# Patient Record
Sex: MALE | Race: WHITE | NOT HISPANIC OR LATINO | Employment: OTHER | URBAN - NONMETROPOLITAN AREA
[De-identification: names, ages, dates, MRNs, and addresses within clinical notes are randomized per-mention and may not be internally consistent; named-entity substitution may affect disease eponyms.]

---

## 2023-03-11 PROBLEM — M19.90 OSTEOARTHROSIS: Status: ACTIVE | Noted: 2023-03-11

## 2023-03-11 PROBLEM — M25.561 RIGHT KNEE PAIN: Status: ACTIVE | Noted: 2023-03-11

## 2023-03-11 PROBLEM — Z86.2 HISTORY OF SARCOIDOSIS: Status: ACTIVE | Noted: 2023-03-11

## 2023-03-11 PROBLEM — D12.6 ADENOMATOUS COLON POLYP: Status: ACTIVE | Noted: 2023-03-11

## 2023-03-11 PROBLEM — M25.50 ARTHRALGIA OF MULTIPLE JOINTS: Status: ACTIVE | Noted: 2023-03-11

## 2023-03-11 PROBLEM — E78.5 HYPERLIPIDEMIA: Status: ACTIVE | Noted: 2023-03-11

## 2023-03-11 PROBLEM — K21.9 GERD (GASTROESOPHAGEAL REFLUX DISEASE): Status: ACTIVE | Noted: 2023-03-11

## 2023-03-11 RX ORDER — MULTIVITAMIN
TABLET ORAL
COMMUNITY

## 2023-03-11 RX ORDER — GUAIFENESIN/PHENYLPROPANOLAMIN
EXPECTORANT ORAL
COMMUNITY

## 2023-03-11 RX ORDER — EPINEPHRINE 0.22MG
AEROSOL WITH ADAPTER (ML) INHALATION
COMMUNITY

## 2023-03-21 ENCOUNTER — OFFICE VISIT (OUTPATIENT)
Dept: PRIMARY CARE | Facility: CLINIC | Age: 88
End: 2023-03-21
Payer: MEDICARE

## 2023-03-21 ENCOUNTER — LAB (OUTPATIENT)
Dept: LAB | Facility: LAB | Age: 88
End: 2023-03-21
Payer: MEDICARE

## 2023-03-21 VITALS
WEIGHT: 190.4 LBS | RESPIRATION RATE: 14 BRPM | OXYGEN SATURATION: 97 % | BODY MASS INDEX: 28.85 KG/M2 | DIASTOLIC BLOOD PRESSURE: 76 MMHG | HEART RATE: 83 BPM | SYSTOLIC BLOOD PRESSURE: 116 MMHG | TEMPERATURE: 97.8 F | HEIGHT: 68 IN

## 2023-03-21 DIAGNOSIS — Z96.651 TOTAL KNEE REPLACEMENT STATUS, RIGHT: ICD-10-CM

## 2023-03-21 DIAGNOSIS — D64.9 ANEMIA, UNSPECIFIED TYPE: ICD-10-CM

## 2023-03-21 DIAGNOSIS — N39.0 URINARY TRACT INFECTION WITHOUT HEMATURIA, SITE UNSPECIFIED: Primary | ICD-10-CM

## 2023-03-21 DIAGNOSIS — R55 SYNCOPE, UNSPECIFIED SYNCOPE TYPE: ICD-10-CM

## 2023-03-21 PROBLEM — M25.561 RIGHT KNEE PAIN: Status: RESOLVED | Noted: 2023-03-11 | Resolved: 2023-03-21

## 2023-03-21 PROCEDURE — 99214 OFFICE O/P EST MOD 30 MIN: CPT | Performed by: FAMILY MEDICINE

## 2023-03-21 PROCEDURE — 85025 COMPLETE CBC W/AUTO DIFF WBC: CPT

## 2023-03-21 PROCEDURE — 1160F RVW MEDS BY RX/DR IN RCRD: CPT | Performed by: FAMILY MEDICINE

## 2023-03-21 PROCEDURE — 1159F MED LIST DOCD IN RCRD: CPT | Performed by: FAMILY MEDICINE

## 2023-03-21 PROCEDURE — 1036F TOBACCO NON-USER: CPT | Performed by: FAMILY MEDICINE

## 2023-03-21 PROCEDURE — 36415 COLL VENOUS BLD VENIPUNCTURE: CPT

## 2023-03-21 RX ORDER — ASPIRIN 325 MG
325 TABLET ORAL ONCE
COMMUNITY
End: 2023-12-14 | Stop reason: WASHOUT

## 2023-03-21 RX ORDER — TALC
250 POWDER (GRAM) TOPICAL DAILY
COMMUNITY

## 2023-03-21 NOTE — PROGRESS NOTES
"Subjective     Patient ID: Juan Ashby is a 88 y.o. male who presents for a hospital follow up.     Flu shot- 09/26/2022  COVID shot- Moderna   Tdap- 07/23/2021  Colon Screen- 04/15/2021    Had knee replacement from [facility] right knee about a month ago and has been doing good.     The day after he came home from his knee surgery, he passed out.   Had a UTI     HPI  Patient had a syncopal episode while at home on 2/22. He states he was subsequently found to be hypotensive. He had R knee replacement 2 days prior to episode. He visited the ED the same day. Imaging was negative for brain damage. His condition improved with fluids. UA revealed bacteria in urine. He was sent home with antibiotics. Symptoms have resolved since discharge.    Patient states that R knee incision is healing well. He did notice a scab below the incision but it improved on its own. He is recovering well from surgery. He is ambulating without a cane. He follows with PT.     Review of Systems  constitutional: as per HPI  eyes:as per HPI  CV:as per HPI  Respiratory:as per HPI  GI:as per HPI  neuro:as per HPI  endo:as per HPI  heme/lymph:as per HPI     Objective   /76 (BP Location: Right arm, Patient Position: Sitting, BP Cuff Size: Adult)   Pulse 83   Temp 36.6 °C (97.8 °F) (Temporal)   Resp 14   Ht 1.727 m (5' 8\")   Wt 86.4 kg (190 lb 6.4 oz)   SpO2 97%   BMI 28.95 kg/m²   Physical Exam  Constitutional:       Appearance: Normal appearance. He is overweight.   Cardiovascular:      Rate and Rhythm: Normal rate and regular rhythm.   Pulmonary:      Effort: Pulmonary effort is normal.      Breath sounds: Normal breath sounds.   Skin:     Comments: Healed abrasion 3 cm below incision on R knee         Assessment/Plan   Problem List Items Addressed This Visit    None  Visit Diagnoses       Urinary tract infection without hematuria, site unspecified    -  Primary    Identified during ED visit on 2/22/23.  Resolved after antibiotic " course.    Syncope, unspecified syncope type        Seen in ED on 2/22/23.  Suspected due to low blood pressure, dehydration, fluid loss s/p surgery.  No recurrent episodes.    Total knee replacement status, right        Incision healing well.  Good ambulation and ROM of knee.  Continue with PT exercises.    Anemia, unspecified type        Discussed that post-operative anemia is not uncommon.  Ordered CBC to recheck anemia status.    Relevant Orders    CBC and Auto Differential                  TSH has continued to increased from 4.44 last year to 6.19 3 weeks ago.  No symptoms of hypothyroidism.  Will recheck TSH after next appt.    Follow up in June.     By signing my name below I, tammi Love, attest that this documentation has been prepared under the direction and in the presence of Dr. John Sandoval. 03/21/23

## 2023-03-22 LAB
BASOPHILS (10*3/UL) IN BLOOD BY AUTOMATED COUNT: 0.04 X10E9/L (ref 0–0.1)
BASOPHILS/100 LEUKOCYTES IN BLOOD BY AUTOMATED COUNT: 0.5 % (ref 0–2)
EOSINOPHILS (10*3/UL) IN BLOOD BY AUTOMATED COUNT: 0.65 X10E9/L (ref 0–0.4)
EOSINOPHILS/100 LEUKOCYTES IN BLOOD BY AUTOMATED COUNT: 8.1 % (ref 0–6)
ERYTHROCYTE DISTRIBUTION WIDTH (RATIO) BY AUTOMATED COUNT: 13.5 % (ref 11.5–14.5)
ERYTHROCYTE MEAN CORPUSCULAR HEMOGLOBIN CONCENTRATION (G/DL) BY AUTOMATED: 32 G/DL (ref 32–36)
ERYTHROCYTE MEAN CORPUSCULAR VOLUME (FL) BY AUTOMATED COUNT: 101 FL (ref 80–100)
ERYTHROCYTES (10*6/UL) IN BLOOD BY AUTOMATED COUNT: 4.07 X10E12/L (ref 4.5–5.9)
HEMATOCRIT (%) IN BLOOD BY AUTOMATED COUNT: 41.3 % (ref 41–52)
HEMOGLOBIN (G/DL) IN BLOOD: 13.2 G/DL (ref 13.5–17.5)
IMMATURE GRANULOCYTES/100 LEUKOCYTES IN BLOOD BY AUTOMATED COUNT: 0.2 % (ref 0–0.9)
LEUKOCYTES (10*3/UL) IN BLOOD BY AUTOMATED COUNT: 8.1 X10E9/L (ref 4.4–11.3)
LYMPHOCYTES (10*3/UL) IN BLOOD BY AUTOMATED COUNT: 1.97 X10E9/L (ref 0.8–3)
LYMPHOCYTES/100 LEUKOCYTES IN BLOOD BY AUTOMATED COUNT: 24.4 % (ref 13–44)
MONOCYTES (10*3/UL) IN BLOOD BY AUTOMATED COUNT: 0.77 X10E9/L (ref 0.05–0.8)
MONOCYTES/100 LEUKOCYTES IN BLOOD BY AUTOMATED COUNT: 9.6 % (ref 2–10)
NEUTROPHILS (10*3/UL) IN BLOOD BY AUTOMATED COUNT: 4.61 X10E9/L (ref 1.6–5.5)
NEUTROPHILS/100 LEUKOCYTES IN BLOOD BY AUTOMATED COUNT: 57.2 % (ref 40–80)
NRBC (PER 100 WBCS) BY AUTOMATED COUNT: 0 /100 WBC (ref 0–0)
PLATELETS (10*3/UL) IN BLOOD AUTOMATED COUNT: 325 X10E9/L (ref 150–450)

## 2023-06-14 ENCOUNTER — OFFICE VISIT (OUTPATIENT)
Dept: PRIMARY CARE | Facility: CLINIC | Age: 88
End: 2023-06-14
Payer: MEDICARE

## 2023-06-14 ENCOUNTER — LAB (OUTPATIENT)
Dept: LAB | Facility: LAB | Age: 88
End: 2023-06-14
Payer: MEDICARE

## 2023-06-14 VITALS
RESPIRATION RATE: 14 BRPM | SYSTOLIC BLOOD PRESSURE: 112 MMHG | HEART RATE: 73 BPM | WEIGHT: 181.1 LBS | BODY MASS INDEX: 27.54 KG/M2 | DIASTOLIC BLOOD PRESSURE: 72 MMHG | TEMPERATURE: 97.8 F | OXYGEN SATURATION: 94 %

## 2023-06-14 DIAGNOSIS — E03.8 OTHER SPECIFIED HYPOTHYROIDISM: ICD-10-CM

## 2023-06-14 DIAGNOSIS — R53.83 FATIGUE, UNSPECIFIED TYPE: ICD-10-CM

## 2023-06-14 DIAGNOSIS — K21.9 GASTROESOPHAGEAL REFLUX DISEASE WITHOUT ESOPHAGITIS: ICD-10-CM

## 2023-06-14 DIAGNOSIS — E78.2 MIXED HYPERLIPIDEMIA: ICD-10-CM

## 2023-06-14 DIAGNOSIS — Z13.89 ENCOUNTER FOR SCREENING FOR OTHER DISORDER: ICD-10-CM

## 2023-06-14 DIAGNOSIS — E03.9 HYPOTHYROIDISM, UNSPECIFIED TYPE: ICD-10-CM

## 2023-06-14 DIAGNOSIS — K21.9 GASTROESOPHAGEAL REFLUX DISEASE WITHOUT ESOPHAGITIS: Primary | ICD-10-CM

## 2023-06-14 DIAGNOSIS — R73.03 PREDIABETES: ICD-10-CM

## 2023-06-14 DIAGNOSIS — D64.9 ANEMIA, UNSPECIFIED TYPE: ICD-10-CM

## 2023-06-14 PROBLEM — Z96.651 STATUS POST TOTAL RIGHT KNEE REPLACEMENT: Status: ACTIVE | Noted: 2023-03-13

## 2023-06-14 PROBLEM — I10 ESSENTIAL (PRIMARY) HYPERTENSION: Status: RESOLVED | Noted: 2023-02-09 | Resolved: 2023-06-14

## 2023-06-14 PROBLEM — I10 ESSENTIAL (PRIMARY) HYPERTENSION: Status: ACTIVE | Noted: 2023-02-09

## 2023-06-14 PROCEDURE — 85025 COMPLETE CBC W/AUTO DIFF WBC: CPT

## 2023-06-14 PROCEDURE — 84439 ASSAY OF FREE THYROXINE: CPT

## 2023-06-14 PROCEDURE — 1170F FXNL STATUS ASSESSED: CPT | Performed by: FAMILY MEDICINE

## 2023-06-14 PROCEDURE — 99214 OFFICE O/P EST MOD 30 MIN: CPT | Performed by: FAMILY MEDICINE

## 2023-06-14 PROCEDURE — 1159F MED LIST DOCD IN RCRD: CPT | Performed by: FAMILY MEDICINE

## 2023-06-14 PROCEDURE — 83540 ASSAY OF IRON: CPT

## 2023-06-14 PROCEDURE — 1036F TOBACCO NON-USER: CPT | Performed by: FAMILY MEDICINE

## 2023-06-14 PROCEDURE — 1160F RVW MEDS BY RX/DR IN RCRD: CPT | Performed by: FAMILY MEDICINE

## 2023-06-14 PROCEDURE — G0444 DEPRESSION SCREEN ANNUAL: HCPCS | Performed by: FAMILY MEDICINE

## 2023-06-14 PROCEDURE — 84443 ASSAY THYROID STIM HORMONE: CPT

## 2023-06-14 PROCEDURE — 84630 ASSAY OF ZINC: CPT

## 2023-06-14 PROCEDURE — 80061 LIPID PANEL: CPT

## 2023-06-14 PROCEDURE — 36415 COLL VENOUS BLD VENIPUNCTURE: CPT

## 2023-06-14 PROCEDURE — G0439 PPPS, SUBSEQ VISIT: HCPCS | Performed by: FAMILY MEDICINE

## 2023-06-14 PROCEDURE — 83550 IRON BINDING TEST: CPT

## 2023-06-14 PROCEDURE — 82607 VITAMIN B-12: CPT

## 2023-06-14 PROCEDURE — 83036 HEMOGLOBIN GLYCOSYLATED A1C: CPT

## 2023-06-14 ASSESSMENT — PATIENT HEALTH QUESTIONNAIRE - PHQ9
2. FEELING DOWN, DEPRESSED OR HOPELESS: NOT AT ALL
1. LITTLE INTEREST OR PLEASURE IN DOING THINGS: NOT AT ALL
SUM OF ALL RESPONSES TO PHQ9 QUESTIONS 1 AND 2: 0

## 2023-06-14 ASSESSMENT — ACTIVITIES OF DAILY LIVING (ADL)
BATHING: INDEPENDENT
DOING_HOUSEWORK: INDEPENDENT
TAKING_MEDICATION: INDEPENDENT
DRESSING: INDEPENDENT
GROCERY_SHOPPING: INDEPENDENT
MANAGING_FINANCES: INDEPENDENT

## 2023-06-14 NOTE — PROGRESS NOTES
Subjective   Reason for Visit: Juan Ashby is an 88 y.o. male here for a Medicare Wellness visit.      Tdap-  due  Colonoscopy- 04/15/2021     Reviewed all medications by prescribing practitioner or clinical pharmacist (such as prescriptions, OTCs, herbal therapies and supplements) and documented in the medical record.    HPI  Patient had right knee arthroplasty with Dr. Cordero on 2/20. The next day he experienced syncopal episode and was seen at the ED. He was diagnosed with UTI and prescribed Z-juanita. Since then, he has not had any recurrent syncope. Patient also had skin infection to the knee s/p surgery. He was treated with oral antibiotics until May. He states that knee pain has been minimal since surgery. He has completed PT.     Patient reports fatigue.    Patient Care Team:  John Sandoval MD as PCP - General  John Sandoval MD as PCP - [facility] Attributed Provider     Review of Systems  constitutional: as per HPI  eyes:as per HPI  CV:as per HPI  Respiratory:as per HPI  GI:as per HPI  neuro:as per HPI  endo:as per HPI  heme/lymph:as per HPI     Objective   Vitals:  /72 (BP Location: Left arm, Patient Position: Sitting, BP Cuff Size: Adult)   Pulse 73   Temp 36.6 °C (97.8 °F) (Temporal)   Resp 14   Wt 82.1 kg (181 lb 1.6 oz)   SpO2 94%   BMI 27.54 kg/m²       Physical Exam  Constitutional:       Appearance: Normal appearance. He is overweight.   Cardiovascular:      Rate and Rhythm: Normal rate and regular rhythm.   Pulmonary:      Effort: Pulmonary effort is normal.      Breath sounds: Normal breath sounds.         Assessment/Plan   Problem List Items Addressed This Visit          Digestive    Gastroesophageal reflux disease without esophagitis - Primary    Current Assessment & Plan     Stable.  May take TUMS or Rolaids as needed.  No eating within 2 hours of going to bed.  May want to elevate the head of your bed.  Avoid caffeine, chocolate, alcohol, spicy foods, acidic foods, fried foods, mint  "flavoring.            Endocrine/Metabolic    Prediabetes    Current Assessment & Plan     Most recent A1c:   Hemoglobin A1C (%)   Date Value   05/26/2021 5.5     You have pre-diabetic level blood sugar.  This means you are at risk for developing diabetes.  ;  I recommend you avoid processed carbohydrates and sugar containing foods/beverages.  If you must have a sweetened beverage, please use Stevia and avoid artificial sweeteners such as aspartame, sucralose, sweet n low, etc.;DRINK WATER!    A good diet plan to follow is the Mediterranean diet.  Use online search to read more about this.;    Another simple rule is to shop the grocery store perimeter -thus filling your cart with fresh fruits, vegetables, lean meats (fish, chicken); dairy, cheese, and nuts.  Avoid the isles where you find bagged and boxed items that are processed. (chips, crackers, cookies, candies, snacks, etc.);    Aerobic cardiovascular exercise 150min weekly is essential for good health, BP control, sugar metabolism, and weight management.;June 14, 2023          Other specified hypothyroidism    Current Assessment & Plan     Most recent blood work showed mildly low thyroid function, which could explain symptom of fatigue.  Ordered repeat thyroid function labs.            Other    Mixed hyperlipidemia    Current Assessment & Plan     Continue lifestyle measures to control cholesterol.    Natural efforts to lower LDL cholesterol:  1. Diet should be predominantly plant based (veggies, fruits, healthy fats such as olive oil, nuts, seeds, avocados, proteins from non meat such as quinoa (\"keenwha\"), chickpeas, lentil, soy, tofu - if choose animal source of protein, choose fish first, skinless poultry second, and 'red' meat as least frequent option  2. eat blueberries 1 cup daily  3. use supplement called plant stanol 950mg twice daily with meals (one brand name is \"Cholest-off\"  4. use psyllium husk fiber such as metamucil powder 1 tablespoon in 8 " ounces water three times a day before meals - gradually work to this dosing over 3 weeks - starting once daily and every week add another dose              By signing my name below I, tammi Love, attest that this documentation has been prepared under the direction and in the presence of Dr. John Sandoval. 06/14/23

## 2023-06-14 NOTE — ASSESSMENT & PLAN NOTE
Most recent A1c:   Hemoglobin A1C (%)   Date Value   05/26/2021 5.5       You have pre-diabetic level blood sugar.  This means you are at risk for developing diabetes.  ;  I recommend you avoid processed carbohydrates and sugar containing foods/beverages.  If you must have a sweetened beverage, please use Stevia and avoid artificial sweeteners such as aspartame, sucralose, sweet n low, etc.;DRINK WATER!    A good diet plan to follow is the Mediterranean diet.  Use online search to read more about this.;    Another simple rule is to shop the grocery store perimeter -thus filling your cart with fresh fruits, vegetables, lean meats (fish, chicken); dairy, cheese, and nuts.  Avoid the isles where you find bagged and boxed items that are processed. (chips, crackers, cookies, candies, snacks, etc.);    Aerobic cardiovascular exercise 150min weekly is essential for good health, BP control, sugar metabolism, and weight management.;June 14, 2023

## 2023-06-14 NOTE — ASSESSMENT & PLAN NOTE
"Continue lifestyle measures to control cholesterol.    Natural efforts to lower LDL cholesterol:  1. Diet should be predominantly plant based (veggies, fruits, healthy fats such as olive oil, nuts, seeds, avocados, proteins from non meat such as quinoa (\"keenwha\"), chickpeas, lentil, soy, tofu - if choose animal source of protein, choose fish first, skinless poultry second, and 'red' meat as least frequent option  2. eat blueberries 1 cup daily  3. use supplement called plant stanol 950mg twice daily with meals (one brand name is \"Cholest-off\"  4. use psyllium husk fiber such as metamucil powder 1 tablespoon in 8 ounces water three times a day before meals - gradually work to this dosing over 3 weeks - starting once daily and every week add another dose   "

## 2023-06-14 NOTE — ASSESSMENT & PLAN NOTE
Most recent blood work showed mildly low thyroid function, which could explain symptom of fatigue.  Ordered repeat thyroid function labs.

## 2023-06-14 NOTE — ASSESSMENT & PLAN NOTE
Stable.  May take TUMS or Rolaids as needed.  No eating within 2 hours of going to bed.  May want to elevate the head of your bed.  Avoid caffeine, chocolate, alcohol, spicy foods, acidic foods, fried foods, mint flavoring.

## 2023-06-15 LAB
BASOPHILS (10*3/UL) IN BLOOD BY AUTOMATED COUNT: 0.04 X10E9/L (ref 0–0.1)
BASOPHILS/100 LEUKOCYTES IN BLOOD BY AUTOMATED COUNT: 0.5 % (ref 0–2)
CHOLESTEROL (MG/DL) IN SER/PLAS: 239 MG/DL (ref 0–199)
CHOLESTEROL IN HDL (MG/DL) IN SER/PLAS: 59 MG/DL
CHOLESTEROL/HDL RATIO: 4.1
COBALAMIN (VITAMIN B12) (PG/ML) IN SER/PLAS: 492 PG/ML (ref 211–911)
EOSINOPHILS (10*3/UL) IN BLOOD BY AUTOMATED COUNT: 0.27 X10E9/L (ref 0–0.4)
EOSINOPHILS/100 LEUKOCYTES IN BLOOD BY AUTOMATED COUNT: 3.6 % (ref 0–6)
ERYTHROCYTE DISTRIBUTION WIDTH (RATIO) BY AUTOMATED COUNT: 13.4 % (ref 11.5–14.5)
ERYTHROCYTE MEAN CORPUSCULAR HEMOGLOBIN CONCENTRATION (G/DL) BY AUTOMATED: 32.2 G/DL (ref 32–36)
ERYTHROCYTE MEAN CORPUSCULAR VOLUME (FL) BY AUTOMATED COUNT: 97 FL (ref 80–100)
ERYTHROCYTES (10*6/UL) IN BLOOD BY AUTOMATED COUNT: 5.03 X10E12/L (ref 4.5–5.9)
ESTIMATED AVERAGE GLUCOSE FOR HBA1C: 131 MG/DL
HEMATOCRIT (%) IN BLOOD BY AUTOMATED COUNT: 48.7 % (ref 41–52)
HEMOGLOBIN (G/DL) IN BLOOD: 15.7 G/DL (ref 13.5–17.5)
HEMOGLOBIN A1C/HEMOGLOBIN TOTAL IN BLOOD: 6.2 %
IMMATURE GRANULOCYTES/100 LEUKOCYTES IN BLOOD BY AUTOMATED COUNT: 0.4 % (ref 0–0.9)
IRON (UG/DL) IN SER/PLAS: 77 UG/DL (ref 35–150)
IRON BINDING CAPACITY (UG/DL) IN SER/PLAS: 352 UG/DL (ref 240–445)
IRON SATURATION (%) IN SER/PLAS: 22 % (ref 25–45)
LDL: 161 MG/DL (ref 0–99)
LEUKOCYTES (10*3/UL) IN BLOOD BY AUTOMATED COUNT: 7.4 X10E9/L (ref 4.4–11.3)
LYMPHOCYTES (10*3/UL) IN BLOOD BY AUTOMATED COUNT: 1.99 X10E9/L (ref 0.8–3)
LYMPHOCYTES/100 LEUKOCYTES IN BLOOD BY AUTOMATED COUNT: 26.8 % (ref 13–44)
MONOCYTES (10*3/UL) IN BLOOD BY AUTOMATED COUNT: 0.53 X10E9/L (ref 0.05–0.8)
MONOCYTES/100 LEUKOCYTES IN BLOOD BY AUTOMATED COUNT: 7.1 % (ref 2–10)
NEUTROPHILS (10*3/UL) IN BLOOD BY AUTOMATED COUNT: 4.56 X10E9/L (ref 1.6–5.5)
NEUTROPHILS/100 LEUKOCYTES IN BLOOD BY AUTOMATED COUNT: 61.6 % (ref 40–80)
NRBC (PER 100 WBCS) BY AUTOMATED COUNT: 0 /100 WBC (ref 0–0)
PLATELETS (10*3/UL) IN BLOOD AUTOMATED COUNT: 225 X10E9/L (ref 150–450)
THYROTROPIN (MIU/L) IN SER/PLAS BY DETECTION LIMIT <= 0.05 MIU/L: 4.09 MIU/L (ref 0.44–3.98)
THYROXINE (T4) FREE (NG/DL) IN SER/PLAS: 1.03 NG/DL (ref 0.78–1.48)
TRIGLYCERIDE (MG/DL) IN SER/PLAS: 95 MG/DL (ref 0–149)
VLDL: 19 MG/DL (ref 0–40)

## 2023-06-17 LAB — ZINC,SERUM OR PLASMA: 89.9 UG/DL (ref 60–120)

## 2023-06-22 ENCOUNTER — TELEPHONE (OUTPATIENT)
Dept: PRIMARY CARE | Facility: CLINIC | Age: 88
End: 2023-06-22

## 2023-06-22 NOTE — TELEPHONE ENCOUNTER
The patients spouse is calling to find out the results on recent labs. Please call her on the home phone

## 2023-12-14 ENCOUNTER — OFFICE VISIT (OUTPATIENT)
Dept: PRIMARY CARE | Facility: CLINIC | Age: 88
End: 2023-12-14
Payer: MEDICARE

## 2023-12-14 ENCOUNTER — LAB (OUTPATIENT)
Dept: LAB | Facility: LAB | Age: 88
End: 2023-12-14
Payer: MEDICARE

## 2023-12-14 VITALS
RESPIRATION RATE: 14 BRPM | TEMPERATURE: 97.5 F | WEIGHT: 180.8 LBS | OXYGEN SATURATION: 96 % | HEART RATE: 64 BPM | BODY MASS INDEX: 27.49 KG/M2 | SYSTOLIC BLOOD PRESSURE: 125 MMHG | DIASTOLIC BLOOD PRESSURE: 72 MMHG

## 2023-12-14 DIAGNOSIS — E03.9 HYPOTHYROIDISM, UNSPECIFIED TYPE: ICD-10-CM

## 2023-12-14 DIAGNOSIS — R73.03 PREDIABETES: ICD-10-CM

## 2023-12-14 DIAGNOSIS — E78.2 MIXED HYPERLIPIDEMIA: ICD-10-CM

## 2023-12-14 DIAGNOSIS — K21.9 GASTROESOPHAGEAL REFLUX DISEASE WITHOUT ESOPHAGITIS: ICD-10-CM

## 2023-12-14 DIAGNOSIS — E03.8 OTHER SPECIFIED HYPOTHYROIDISM: ICD-10-CM

## 2023-12-14 DIAGNOSIS — M25.50 ARTHRALGIA OF MULTIPLE JOINTS: ICD-10-CM

## 2023-12-14 DIAGNOSIS — E78.2 MIXED HYPERLIPIDEMIA: Primary | ICD-10-CM

## 2023-12-14 PROBLEM — C44.611 BASAL CELL CARCINOMA (BCC) OF UPPER EXTREMITY: Status: ACTIVE | Noted: 2023-12-14

## 2023-12-14 PROCEDURE — 90662 IIV NO PRSV INCREASED AG IM: CPT | Performed by: FAMILY MEDICINE

## 2023-12-14 PROCEDURE — 1036F TOBACCO NON-USER: CPT | Performed by: FAMILY MEDICINE

## 2023-12-14 PROCEDURE — 80053 COMPREHEN METABOLIC PANEL: CPT

## 2023-12-14 PROCEDURE — 1159F MED LIST DOCD IN RCRD: CPT | Performed by: FAMILY MEDICINE

## 2023-12-14 PROCEDURE — G0008 ADMIN INFLUENZA VIRUS VAC: HCPCS | Performed by: FAMILY MEDICINE

## 2023-12-14 PROCEDURE — 85025 COMPLETE CBC W/AUTO DIFF WBC: CPT

## 2023-12-14 PROCEDURE — 1160F RVW MEDS BY RX/DR IN RCRD: CPT | Performed by: FAMILY MEDICINE

## 2023-12-14 PROCEDURE — 36415 COLL VENOUS BLD VENIPUNCTURE: CPT

## 2023-12-14 PROCEDURE — 83036 HEMOGLOBIN GLYCOSYLATED A1C: CPT

## 2023-12-14 PROCEDURE — 99214 OFFICE O/P EST MOD 30 MIN: CPT | Performed by: FAMILY MEDICINE

## 2023-12-14 PROCEDURE — 84443 ASSAY THYROID STIM HORMONE: CPT

## 2023-12-14 PROCEDURE — 80061 LIPID PANEL: CPT

## 2023-12-14 PROCEDURE — 84439 ASSAY OF FREE THYROXINE: CPT

## 2023-12-14 NOTE — ASSESSMENT & PLAN NOTE
Lab Results   Component Value Date    HGBA1C 6.2 (A) 06/14/2023     Repeat A1C ordered.    You have pre-diabetic level blood sugar.  This means you are at risk for developing diabetes.  ;  I recommend you avoid processed carbohydrates and sugar containing foods/beverages.  If you must have a sweetened beverage, please use Stevia and avoid artificial sweeteners such as aspartame, sucralose, sweet n low, etc.;DRINK WATER!    A good diet plan to follow is the Mediterranean diet.  Use online search to read more about this.;    Another simple rule is to shop the grocery store perimeter -thus filling your cart with fresh fruits, vegetables, lean meats (fish, chicken); dairy, cheese, and nuts.  Avoid the isles where you find bagged and boxed items that are processed. (chips, crackers, cookies, candies, snacks, etc.);    Aerobic cardiovascular exercise 150min weekly is essential for good health, BP control, sugar metabolism, and weight management.;

## 2023-12-14 NOTE — ASSESSMENT & PLAN NOTE
"Lab Results   Component Value Date    CHOL 239 (H) 06/14/2023    CHOL 234 (H) 12/07/2022    CHOL 216 (H) 05/26/2021     Lab Results   Component Value Date    HDL 59.0 06/14/2023    HDL 66.1 12/07/2022    HDL 61.2 05/26/2021       Lab Results   Component Value Date    TRIG 95 06/14/2023    TRIG 109 12/07/2022    TRIG 75 05/26/2021       Continue lifestyle measures to control cholesterol.  Repeat lipid panel ordered.    Natural efforts to lower LDL cholesterol:  1. Diet should be predominantly plant based (veggies, fruits, healthy fats such as olive oil, nuts, seeds, avocados, proteins from non meat such as quinoa (\"keenwha\"), chickpeas, lentil, soy, tofu - if choose animal source of protein, choose fish first, skinless poultry second, and 'red' meat as least frequent option  2. eat blueberries 1 cup daily  3. use supplement called plant stanol 950mg twice daily with meals (one brand name is \"Cholest-off\"  4. use psyllium husk fiber such as metamucil powder 1 tablespoon in 8 ounces water three times a day before meals - gradually work to this dosing over 3 weeks - starting once daily and every week add another dose   "

## 2023-12-14 NOTE — PROGRESS NOTES
"Subjective   Patient ID: Juan Ashby is an 88 year old male here for a follow-up for chronic health conditions.    He is no longer taking baby aspirin. He feels well overall. He does not take Tylenol for joint pain.   He now has hearing aids.         Review of Systems   HENT:  Positive for hearing loss.        Objective   /72 (BP Location: Left arm, Patient Position: Sitting, BP Cuff Size: Adult)   Pulse 64   Temp 36.4 °C (97.5 °F)   Resp 14   Wt 82 kg (180 lb 12.8 oz)   SpO2 96%   BMI 27.49 kg/m²     Physical Exam  Constitutional:       Appearance: Normal appearance.   Cardiovascular:      Rate and Rhythm: Normal rate and regular rhythm.      Pulses: Normal pulses.      Heart sounds: Normal heart sounds.   Pulmonary:      Effort: Pulmonary effort is normal.      Breath sounds: Normal breath sounds.   Neurological:      Mental Status: He is alert.           Problem List Items Addressed This Visit          Cardiac and Vasculature    Mixed hyperlipidemia - Primary     Lab Results   Component Value Date    CHOL 239 (H) 06/14/2023    CHOL 234 (H) 12/07/2022    CHOL 216 (H) 05/26/2021     Lab Results   Component Value Date    HDL 59.0 06/14/2023    HDL 66.1 12/07/2022    HDL 61.2 05/26/2021     Lab Results   Component Value Date    TRIG 95 06/14/2023    TRIG 109 12/07/2022    TRIG 75 05/26/2021     Continue lifestyle measures to control cholesterol.  Repeat lipid panel ordered.    Natural efforts to lower LDL cholesterol:  1. Diet should be predominantly plant based (veggies, fruits, healthy fats such as olive oil, nuts, seeds, avocados, proteins from non meat such as quinoa (\"keenwha\"), chickpeas, lentil, soy, tofu - if choose animal source of protein, choose fish first, skinless poultry second, and 'red' meat as least frequent option  2. eat blueberries 1 cup daily  3. use supplement called plant stanol 950mg twice daily with meals (one brand name is \"Cholest-off\"  4. use psyllium husk fiber such as " metamucil powder 1 tablespoon in 8 ounces water three times a day before meals - gradually work to this dosing over 3 weeks - starting once daily and every week add another dose             Endocrine/Metabolic    Prediabetes     Lab Results   Component Value Date    HGBA1C 6.2 (A) 06/14/2023   Repeat A1C ordered.    You have pre-diabetic level blood sugar.  This means you are at risk for developing diabetes.  ;  I recommend you avoid processed carbohydrates and sugar containing foods/beverages.  If you must have a sweetened beverage, please use Stevia and avoid artificial sweeteners such as aspartame, sucralose, sweet n low, etc.;DRINK WATER!    A good diet plan to follow is the Mediterranean diet.  Use online search to read more about this.;    Another simple rule is to shop the grocery store perimeter -thus filling your cart with fresh fruits, vegetables, lean meats (fish, chicken); dairy, cheese, and nuts.  Avoid the isles where you find bagged and boxed items that are processed. (chips, crackers, cookies, candies, snacks, etc.);    Aerobic cardiovascular exercise 150min weekly is essential for good health, BP control, sugar metabolism, and weight management.;         Other specified hypothyroidism     Lab Results   Component Value Date    TSH 4.09 (H) 06/14/2023   Repeat TSH level ordered.            Musculoskeletal and Injuries    Arthralgia of multiple joints     Scribe Attestation  By signing my name below, I, Keyonna Holt , Scribe   attest that this documentation has been prepared under the direction and in the presence of John Sandoval MD.

## 2023-12-15 LAB
ALBUMIN SERPL BCP-MCNC: 4.6 G/DL (ref 3.4–5)
ALP SERPL-CCNC: 75 U/L (ref 33–136)
ALT SERPL W P-5'-P-CCNC: 17 U/L (ref 10–52)
ANION GAP SERPL CALC-SCNC: 13 MMOL/L (ref 10–20)
AST SERPL W P-5'-P-CCNC: 18 U/L (ref 9–39)
BASOPHILS # BLD AUTO: 0.04 X10*3/UL (ref 0–0.1)
BASOPHILS NFR BLD AUTO: 0.6 %
BILIRUB SERPL-MCNC: 1.6 MG/DL (ref 0–1.2)
BUN SERPL-MCNC: 15 MG/DL (ref 6–23)
CALCIUM SERPL-MCNC: 10 MG/DL (ref 8.6–10.6)
CHLORIDE SERPL-SCNC: 104 MMOL/L (ref 98–107)
CHOLEST SERPL-MCNC: 237 MG/DL (ref 0–199)
CHOLESTEROL/HDL RATIO: 3.9
CO2 SERPL-SCNC: 30 MMOL/L (ref 21–32)
CREAT SERPL-MCNC: 0.59 MG/DL (ref 0.5–1.3)
EOSINOPHIL # BLD AUTO: 0.32 X10*3/UL (ref 0–0.4)
EOSINOPHIL NFR BLD AUTO: 5.2 %
ERYTHROCYTE [DISTWIDTH] IN BLOOD BY AUTOMATED COUNT: 13 % (ref 11.5–14.5)
EST. AVERAGE GLUCOSE BLD GHB EST-MCNC: 111 MG/DL
GFR SERPL CREATININE-BSD FRML MDRD: >90 ML/MIN/1.73M*2
GLUCOSE SERPL-MCNC: 105 MG/DL (ref 74–99)
HBA1C MFR BLD: 5.5 %
HCT VFR BLD AUTO: 46.3 % (ref 41–52)
HDLC SERPL-MCNC: 60.1 MG/DL
HGB BLD-MCNC: 15.4 G/DL (ref 13.5–17.5)
IMM GRANULOCYTES # BLD AUTO: 0.01 X10*3/UL (ref 0–0.5)
IMM GRANULOCYTES NFR BLD AUTO: 0.2 % (ref 0–0.9)
LDLC SERPL CALC-MCNC: 156 MG/DL
LYMPHOCYTES # BLD AUTO: 1.88 X10*3/UL (ref 0.8–3)
LYMPHOCYTES NFR BLD AUTO: 30.4 %
MCH RBC QN AUTO: 32.6 PG (ref 26–34)
MCHC RBC AUTO-ENTMCNC: 33.3 G/DL (ref 32–36)
MCV RBC AUTO: 98 FL (ref 80–100)
MONOCYTES # BLD AUTO: 0.54 X10*3/UL (ref 0.05–0.8)
MONOCYTES NFR BLD AUTO: 8.7 %
NEUTROPHILS # BLD AUTO: 3.39 X10*3/UL (ref 1.6–5.5)
NEUTROPHILS NFR BLD AUTO: 54.9 %
NON HDL CHOLESTEROL: 177 MG/DL (ref 0–149)
NRBC BLD-RTO: 0 /100 WBCS (ref 0–0)
PLATELET # BLD AUTO: 234 X10*3/UL (ref 150–450)
POTASSIUM SERPL-SCNC: 4.7 MMOL/L (ref 3.5–5.3)
PROT SERPL-MCNC: 6.9 G/DL (ref 6.4–8.2)
RBC # BLD AUTO: 4.72 X10*6/UL (ref 4.5–5.9)
SODIUM SERPL-SCNC: 142 MMOL/L (ref 136–145)
T4 FREE SERPL-MCNC: 1 NG/DL (ref 0.78–1.48)
TRIGL SERPL-MCNC: 106 MG/DL (ref 0–149)
TSH SERPL-ACNC: 4.42 MIU/L (ref 0.44–3.98)
VLDL: 21 MG/DL (ref 0–40)
WBC # BLD AUTO: 6.2 X10*3/UL (ref 4.4–11.3)

## 2024-06-19 ENCOUNTER — APPOINTMENT (OUTPATIENT)
Dept: PRIMARY CARE | Facility: CLINIC | Age: 89
End: 2024-06-19
Payer: MEDICARE

## 2024-07-03 ENCOUNTER — APPOINTMENT (OUTPATIENT)
Dept: PRIMARY CARE | Facility: CLINIC | Age: 89
End: 2024-07-03
Payer: MEDICARE

## 2024-07-03 ENCOUNTER — LAB (OUTPATIENT)
Dept: LAB | Facility: LAB | Age: 89
End: 2024-07-03
Payer: MEDICARE

## 2024-07-03 VITALS
HEIGHT: 68 IN | TEMPERATURE: 97.1 F | BODY MASS INDEX: 27.31 KG/M2 | HEART RATE: 74 BPM | RESPIRATION RATE: 14 BRPM | SYSTOLIC BLOOD PRESSURE: 127 MMHG | DIASTOLIC BLOOD PRESSURE: 74 MMHG | OXYGEN SATURATION: 97 % | WEIGHT: 180.2 LBS

## 2024-07-03 DIAGNOSIS — M15.9 PRIMARY OSTEOARTHRITIS INVOLVING MULTIPLE JOINTS: ICD-10-CM

## 2024-07-03 DIAGNOSIS — Z00.00 ROUTINE GENERAL MEDICAL EXAMINATION AT HEALTH CARE FACILITY: ICD-10-CM

## 2024-07-03 DIAGNOSIS — E78.2 MIXED HYPERLIPIDEMIA: ICD-10-CM

## 2024-07-03 DIAGNOSIS — R73.03 PREDIABETES: ICD-10-CM

## 2024-07-03 DIAGNOSIS — K21.9 GASTROESOPHAGEAL REFLUX DISEASE WITHOUT ESOPHAGITIS: ICD-10-CM

## 2024-07-03 DIAGNOSIS — M25.552 PAIN OF LEFT HIP: ICD-10-CM

## 2024-07-03 DIAGNOSIS — E03.8 SUBCLINICAL HYPOTHYROIDISM: ICD-10-CM

## 2024-07-03 DIAGNOSIS — R10.2 PELVIC PAIN: Primary | ICD-10-CM

## 2024-07-03 DIAGNOSIS — D12.6 ADENOMATOUS POLYP OF COLON, UNSPECIFIED PART OF COLON: ICD-10-CM

## 2024-07-03 DIAGNOSIS — M25.50 ARTHRALGIA OF MULTIPLE JOINTS: ICD-10-CM

## 2024-07-03 LAB
EST. AVERAGE GLUCOSE BLD GHB EST-MCNC: 117 MG/DL
HBA1C MFR BLD: 5.7 %
T4 FREE SERPL-MCNC: 1.07 NG/DL (ref 0.78–1.48)
TSH SERPL-ACNC: 4.59 MIU/L (ref 0.44–3.98)
VIT B12 SERPL-MCNC: 495 PG/ML (ref 211–911)

## 2024-07-03 PROCEDURE — 36415 COLL VENOUS BLD VENIPUNCTURE: CPT

## 2024-07-03 PROCEDURE — 1123F ACP DISCUSS/DSCN MKR DOCD: CPT | Performed by: FAMILY MEDICINE

## 2024-07-03 PROCEDURE — 82607 VITAMIN B-12: CPT

## 2024-07-03 PROCEDURE — 84443 ASSAY THYROID STIM HORMONE: CPT

## 2024-07-03 PROCEDURE — 1157F ADVNC CARE PLAN IN RCRD: CPT | Performed by: FAMILY MEDICINE

## 2024-07-03 PROCEDURE — 1159F MED LIST DOCD IN RCRD: CPT | Performed by: FAMILY MEDICINE

## 2024-07-03 PROCEDURE — 1158F ADVNC CARE PLAN TLK DOCD: CPT | Performed by: FAMILY MEDICINE

## 2024-07-03 PROCEDURE — G0439 PPPS, SUBSEQ VISIT: HCPCS | Performed by: FAMILY MEDICINE

## 2024-07-03 PROCEDURE — 1170F FXNL STATUS ASSESSED: CPT | Performed by: FAMILY MEDICINE

## 2024-07-03 PROCEDURE — 83036 HEMOGLOBIN GLYCOSYLATED A1C: CPT

## 2024-07-03 PROCEDURE — 84439 ASSAY OF FREE THYROXINE: CPT

## 2024-07-03 ASSESSMENT — ENCOUNTER SYMPTOMS
SHORTNESS OF BREATH: 0
FATIGUE: 0
BRUISES/BLEEDS EASILY: 1
WHEEZING: 0
PALPITATIONS: 0
ARTHRALGIAS: 1

## 2024-07-03 ASSESSMENT — ACTIVITIES OF DAILY LIVING (ADL)
DRESSING: INDEPENDENT
BATHING: INDEPENDENT
TAKING_MEDICATION: INDEPENDENT
DOING_HOUSEWORK: INDEPENDENT
MANAGING_FINANCES: INDEPENDENT
GROCERY_SHOPPING: INDEPENDENT

## 2024-07-03 NOTE — PROGRESS NOTES
"Subjective   Reason for Visit: Juan Ashby is an 89 y.o. male here for a Medicare Wellness visit.     Would like his left hip xray, having achy pains.   Forgetting a few things here and there, feels like a daze sometimes.       He is having pain in his hip and tailbone, started last month. It is becoming difficult for him to get from a sitting position to standing. He can walk fine, he walks with his wife several days per week. His arthritis has remained stable, he takes Tylenol as needed. He states that he is still able to do daily activities with barely any pain.   He has noticed more recently that he bleeds more easily with simple bumps or scrapes.        Patient Care Team:  John Sandoval MD as PCP - General  John Sandoval MD as PCP - [facility] Attributed Provider     Review of Systems   Constitutional:  Negative for fatigue.   Respiratory:  Negative for shortness of breath and wheezing.    Cardiovascular:  Negative for chest pain and palpitations.   Musculoskeletal:  Positive for arthralgias.        Left hip/pelvic pain   Hematological:  Bruises/bleeds easily.       Objective   Vitals:  /74 (BP Location: Left arm, Patient Position: Sitting, BP Cuff Size: Adult)   Pulse 74   Temp 36.2 °C (97.1 °F) (Temporal)   Resp 14   Ht 1.727 m (5' 8\")   Wt 81.7 kg (180 lb 3.2 oz)   SpO2 97%   BMI 27.40 kg/m²       Physical Exam  Constitutional:       Appearance: Normal appearance. He is normal weight.   HENT:      Head: Normocephalic.      Right Ear: Tympanic membrane normal.      Left Ear: Tympanic membrane normal.      Nose: Nose normal.      Mouth/Throat:      Mouth: Mucous membranes are moist.      Pharynx: Oropharynx is clear.   Eyes:      Conjunctiva/sclera: Conjunctivae normal.   Cardiovascular:      Rate and Rhythm: Normal rate and regular rhythm.      Pulses: Normal pulses.      Heart sounds: Normal heart sounds.   Pulmonary:      Effort: Pulmonary effort is normal.      Breath sounds: Normal breath " sounds.   Abdominal:      General: Abdomen is flat. Bowel sounds are normal.      Palpations: Abdomen is soft.   Musculoskeletal:         General: Normal range of motion.      Cervical back: Normal range of motion.      Comments: Left pelvic pain with pressure   Skin:     General: Skin is warm and dry.   Neurological:      General: No focal deficit present.      Mental Status: He is alert and oriented to person, place, and time.   Psychiatric:         Mood and Affect: Mood normal.         Behavior: Behavior normal.         Thought Content: Thought content normal.         Judgment: Judgment normal.         Assessment/Plan   Problem List Items Addressed This Visit       Adenomatous colon polyp    Current Assessment & Plan     Last c-scope done 4/15/2021.  Pt undecided at this time if he will continue screenings.         Arthralgia of multiple joints    Current Assessment & Plan     Symptoms remain stable, not life altering.  Will use OTC Tylenol PRN for pain.         Gastroesophageal reflux disease without esophagitis    Current Assessment & Plan     Stable.  May take TUMS or Rolaids as needed.  No eating within 2 hours of going to bed.  May want to elevate the head of your bed.  Avoid caffeine, chocolate, alcohol, spicy foods, acidic foods, fried foods, mint flavoring.         Mixed hyperlipidemia - Primary    Current Assessment & Plan     Lab Results   Component Value Date    CHOL 237 (H) 12/14/2023    CHOL 239 (H) 06/14/2023    CHOL 234 (H) 12/07/2022     Lab Results   Component Value Date    HDL 60.1 12/14/2023    HDL 59.0 06/14/2023    HDL 66.1 12/07/2022     Lab Results   Component Value Date    LDLF 161 (H) 06/14/2023    LDLF 146 (H) 12/07/2022    LDLF 140 (H) 05/26/2021     Lab Results   Component Value Date    TRIG 106 12/14/2023    TRIG 95 06/14/2023    TRIG 109 12/07/2022   Stable.  Continue lifestyle measures to control cholesterol.  Repeat lipid panel ordered.    Natural efforts to lower LDL  "cholesterol:  1. Diet should be predominantly plant based (veggies, fruits, healthy fats such as olive oil, nuts, seeds, avocados, proteins from non meat such as quinoa (\"keenwha\"), chickpeas, lentil, soy, tofu - if choose animal source of protein, choose fish first, skinless poultry second, and 'red' meat as least frequent option  2. eat blueberries 1 cup daily  3. use supplement called plant stanol 950mg twice daily with meals (one brand name is \"Cholest-off\"  4. use psyllium husk fiber such as metamucil powder 1 tablespoon in 8 ounces water three times a day before meals - gradually work to this dosing over 3 weeks - starting once daily and every week add another dose          Osteoarthrosis    Prediabetes    Current Assessment & Plan     Lab Results   Component Value Date    HGBA1C 5.5 12/14/2023   Stable.  Repeat A1C ordered.    You have pre-diabetic level blood sugar.  This means you are at risk for developing diabetes.  ;  I recommend you avoid processed carbohydrates and sugar containing foods/beverages.  If you must have a sweetened beverage, please use Stevia and avoid artificial sweeteners such as aspartame, sucralose, sweet n low, etc.;DRINK WATER!    A good diet plan to follow is the Mediterranean diet.  Use online search to read more about this.;    Another simple rule is to shop the grocery store perimeter -thus filling your cart with fresh fruits, vegetables, lean meats (fish, chicken); dairy, cheese, and nuts.  Avoid the isles where you find bagged and boxed items that are processed. (chips, crackers, cookies, candies, snacks, etc.);    Aerobic cardiovascular exercise 150min weekly is essential for good health, BP control, sugar metabolism, and weight management.;         Relevant Orders    Hemoglobin A1C    Vitamin B12    Subclinical hypothyroidism    Current Assessment & Plan     Lab Results   Component Value Date    TSH 4.42 (H) 12/14/2023   Repeat TSH level ordered.         Relevant Orders    " TSH with reflex to Free T4 if abnormal    Vitamin B12     Follow up: Scheduled 1/9/2025    Scribe Attestation  By signing my name below, I, Rafat Brower   attest that this documentation has been prepared under the direction and in the presence of John Sandoval MD.

## 2024-07-03 NOTE — ASSESSMENT & PLAN NOTE
"Lab Results   Component Value Date    CHOL 237 (H) 12/14/2023    CHOL 239 (H) 06/14/2023    CHOL 234 (H) 12/07/2022     Lab Results   Component Value Date    HDL 60.1 12/14/2023    HDL 59.0 06/14/2023    HDL 66.1 12/07/2022     Lab Results   Component Value Date    LDLF 161 (H) 06/14/2023    LDLF 146 (H) 12/07/2022    LDLF 140 (H) 05/26/2021     Lab Results   Component Value Date    TRIG 106 12/14/2023    TRIG 95 06/14/2023    TRIG 109 12/07/2022   Stable.  Continue lifestyle measures to control cholesterol.  Repeat lipid panel ordered.    Natural efforts to lower LDL cholesterol:  1. Diet should be predominantly plant based (veggies, fruits, healthy fats such as olive oil, nuts, seeds, avocados, proteins from non meat such as quinoa (\"keenwha\"), chickpeas, lentil, soy, tofu - if choose animal source of protein, choose fish first, skinless poultry second, and 'red' meat as least frequent option  2. eat blueberries 1 cup daily  3. use supplement called plant stanol 950mg twice daily with meals (one brand name is \"Cholest-off\"  4. use psyllium husk fiber such as metamucil powder 1 tablespoon in 8 ounces water three times a day before meals - gradually work to this dosing over 3 weeks - starting once daily and every week add another dose   "

## 2024-07-03 NOTE — ASSESSMENT & PLAN NOTE
Lab Results   Component Value Date    HGBA1C 5.5 12/14/2023   Stable.  Repeat A1C ordered.    You have pre-diabetic level blood sugar.  This means you are at risk for developing diabetes.  ;  I recommend you avoid processed carbohydrates and sugar containing foods/beverages.  If you must have a sweetened beverage, please use Stevia and avoid artificial sweeteners such as aspartame, sucralose, sweet n low, etc.;DRINK WATER!    A good diet plan to follow is the Mediterranean diet.  Use online search to read more about this.;    Another simple rule is to shop the grocery store perimeter -thus filling your cart with fresh fruits, vegetables, lean meats (fish, chicken); dairy, cheese, and nuts.  Avoid the isles where you find bagged and boxed items that are processed. (chips, crackers, cookies, candies, snacks, etc.);    Aerobic cardiovascular exercise 150min weekly is essential for good health, BP control, sugar metabolism, and weight management.;

## 2024-07-03 NOTE — PROGRESS NOTES
"Subjective   Reason for Visit: Juan Ashby is an 89 y.o. male here for a Medicare Wellness visit.               HPI    Patient Care Team:  John Sandoval MD as PCP - General  John Sandoval MD as PCP [facility] [place] [place]     Review of Systems    Objective   Vitals:  /74 (BP Location: Left arm, Patient Position: Sitting, BP Cuff Size: Adult)   Pulse 74   Temp 36.2 °C (97.1 °F) (Temporal)   Resp 14   Ht 1.727 m (5' 8\")   Wt 81.7 kg (180 lb 3.2 oz)   SpO2 97%   BMI 27.40 kg/m²       Physical Exam    Assessment/Plan   Problem List Items Addressed This Visit       Adenomatous colon polyp    Current Assessment & Plan     Last c-scope done 4/15/2021.  Pt undecided at this time if he will continue screenings.         Arthralgia of multiple joints    Current Assessment & Plan     Symptoms remain stable, not life altering.  Will use OTC Tylenol PRN for pain.         Gastroesophageal reflux disease without esophagitis    Current Assessment & Plan     Stable.  May take TUMS or Rolaids as needed.  No eating within 2 hours of going to bed.  May want to elevate the head of your bed.  Avoid caffeine, chocolate, alcohol, spicy foods, acidic foods, fried foods, mint flavoring.         Mixed hyperlipidemia    Current Assessment & Plan     Lab Results   Component Value Date    CHOL 237 (H) 12/14/2023    CHOL 239 (H) 06/14/2023    CHOL 234 (H) 12/07/2022     Lab Results   Component Value Date    HDL 60.1 12/14/2023    HDL 59.0 06/14/2023    HDL 66.1 12/07/2022     Lab Results   Component Value Date    LDLF 161 (H) 06/14/2023    LDLF 146 (H) 12/07/2022    LDLF 140 (H) 05/26/2021     Lab Results   Component Value Date    TRIG 106 12/14/2023    TRIG 95 06/14/2023    TRIG 109 12/07/2022   Stable.  Continue lifestyle measures to control cholesterol.  Repeat lipid panel ordered.    Natural efforts to lower LDL cholesterol:  1. Diet should be predominantly plant based (veggies, fruits, healthy fats such as olive oil, " "nuts, seeds, avocados, proteins from non meat such as quinoa (\"keenwha\"), chickpeas, lentil, soy, tofu - if choose animal source of protein, choose fish first, skinless poultry second, and 'red' meat as least frequent option  2. eat blueberries 1 cup daily  3. use supplement called plant stanol 950mg twice daily with meals (one brand name is \"Cholest-off\"  4. use psyllium husk fiber such as metamucil powder 1 tablespoon in 8 ounces water three times a day before meals - gradually work to this dosing over 3 weeks - starting once daily and every week add another dose          Osteoarthrosis    Prediabetes    Current Assessment & Plan     Lab Results   Component Value Date    HGBA1C 5.5 12/14/2023   Stable.  Repeat A1C ordered.    You have pre-diabetic level blood sugar.  This means you are at risk for developing diabetes.  ;  I recommend you avoid processed carbohydrates and sugar containing foods/beverages.  If you must have a sweetened beverage, please use Stevia and avoid artificial sweeteners such as aspartame, sucralose, sweet n low, etc.;DRINK WATER!    A good diet plan to follow is the Mediterranean diet.  Use online search to read more about this.;    Another simple rule is to shop the grocery store perimeter -thus filling your cart with fresh fruits, vegetables, lean meats (fish, chicken); dairy, cheese, and nuts.  Avoid the isles where you find bagged and boxed items that are processed. (chips, crackers, cookies, candies, snacks, etc.);    Aerobic cardiovascular exercise 150min weekly is essential for good health, BP control, sugar metabolism, and weight management.;         Relevant Orders    Hemoglobin A1C (Completed)    Vitamin B12 (Completed)    Subclinical hypothyroidism    Current Assessment & Plan     Lab Results   Component Value Date    TSH 4.42 (H) 12/14/2023   Repeat TSH level ordered.         Relevant Orders    TSH with reflex to Free T4 if abnormal (Completed)    Vitamin B12 (Completed) "     Other Visit Diagnoses       Pelvic pain    -  Primary    Relevant Orders    XR hip left with pelvis when performed 2 or 3 views (Completed)    Pain of left hip        Relevant Orders    XR hip left with pelvis when performed 2 or 3 views (Completed)    Routine general medical examination at health care facility

## 2024-07-05 ENCOUNTER — HOSPITAL ENCOUNTER (OUTPATIENT)
Dept: RADIOLOGY | Facility: HOSPITAL | Age: 89
Discharge: HOME | End: 2024-07-05
Payer: MEDICARE

## 2024-07-05 DIAGNOSIS — M25.552 PAIN OF LEFT HIP: ICD-10-CM

## 2024-07-05 DIAGNOSIS — R10.2 PELVIC PAIN: ICD-10-CM

## 2024-07-05 PROCEDURE — 73502 X-RAY EXAM HIP UNI 2-3 VIEWS: CPT | Mod: LEFT SIDE | Performed by: RADIOLOGY

## 2024-07-05 PROCEDURE — 73502 X-RAY EXAM HIP UNI 2-3 VIEWS: CPT | Mod: LT

## 2024-10-30 ENCOUNTER — CLINICAL SUPPORT (OUTPATIENT)
Dept: PRIMARY CARE | Facility: CLINIC | Age: 89
End: 2024-10-30
Payer: MEDICARE

## 2024-10-30 PROCEDURE — G0008 ADMIN INFLUENZA VIRUS VAC: HCPCS | Performed by: FAMILY MEDICINE

## 2024-10-30 PROCEDURE — 90662 IIV NO PRSV INCREASED AG IM: CPT | Performed by: FAMILY MEDICINE

## 2025-01-09 ENCOUNTER — APPOINTMENT (OUTPATIENT)
Dept: PRIMARY CARE | Facility: CLINIC | Age: 89
End: 2025-01-09
Payer: MEDICARE

## 2025-01-15 ENCOUNTER — OFFICE VISIT (OUTPATIENT)
Dept: PRIMARY CARE | Facility: CLINIC | Age: 89
End: 2025-01-15
Payer: MEDICARE

## 2025-01-15 VITALS
SYSTOLIC BLOOD PRESSURE: 119 MMHG | TEMPERATURE: 97.1 F | OXYGEN SATURATION: 96 % | BODY MASS INDEX: 28.65 KG/M2 | WEIGHT: 188.4 LBS | RESPIRATION RATE: 14 BRPM | DIASTOLIC BLOOD PRESSURE: 64 MMHG | HEART RATE: 68 BPM

## 2025-01-15 DIAGNOSIS — S09.90XD CLOSED HEAD INJURY, SUBSEQUENT ENCOUNTER: Primary | ICD-10-CM

## 2025-01-15 PROBLEM — S09.90XA CLOSED HEAD INJURY: Status: ACTIVE | Noted: 2025-01-15

## 2025-01-15 PROBLEM — S01.01XA SCALP LACERATION: Status: ACTIVE | Noted: 2025-01-15

## 2025-01-15 PROCEDURE — 1159F MED LIST DOCD IN RCRD: CPT | Performed by: FAMILY MEDICINE

## 2025-01-15 PROCEDURE — 1157F ADVNC CARE PLAN IN RCRD: CPT | Performed by: FAMILY MEDICINE

## 2025-01-15 PROCEDURE — 1123F ACP DISCUSS/DSCN MKR DOCD: CPT | Performed by: FAMILY MEDICINE

## 2025-01-15 PROCEDURE — 99214 OFFICE O/P EST MOD 30 MIN: CPT | Performed by: FAMILY MEDICINE

## 2025-01-15 PROCEDURE — 1160F RVW MEDS BY RX/DR IN RCRD: CPT | Performed by: FAMILY MEDICINE

## 2025-01-15 ASSESSMENT — ENCOUNTER SYMPTOMS
PSYCHIATRIC NEGATIVE: 1
RESPIRATORY NEGATIVE: 1
NEUROLOGICAL NEGATIVE: 1
WOUND: 1
CARDIOVASCULAR NEGATIVE: 1
GASTROINTESTINAL NEGATIVE: 1

## 2025-01-15 NOTE — PROGRESS NOTES
Subjective   Patient ID: Juan Ashby is a 89 y.o. male who presents for Follow-up (Fall 01/11/2024-syncope while on 1 mile walk).    The fall occurred on 1/11. The walk he was on is the same distance he goes often. He does remember feeling like he was going to pass out. When he got back home he cleaned his head up immediately and denies feeling dizzy, nauseous, no vomiting or headache. Imaging was performed at the hospital which was all normal but he was warned that his brain may swell. Overall he feels fine now.          Review of Systems   HENT: Negative.     Respiratory: Negative.     Cardiovascular: Negative.    Gastrointestinal: Negative.    Genitourinary: Negative.    Skin:  Positive for wound (abrasion on head, healing).   Neurological: Negative.    Psychiatric/Behavioral: Negative.         Objective   /64 (BP Location: Right arm, Patient Position: Sitting, BP Cuff Size: Adult)   Pulse 68   Temp 36.2 °C (97.1 °F) (Temporal)   Resp 14   Wt 85.5 kg (188 lb 6.4 oz)   SpO2 96%   BMI 28.65 kg/m²     Physical Exam  Constitutional:       Appearance: Normal appearance. He is overweight.   HENT:      Head: Normocephalic.   Eyes:      Conjunctiva/sclera: Conjunctivae normal.   Musculoskeletal:      Cervical back: Normal range of motion.   Skin:     Findings: Wound (forehead) present.      Comments: Post fall, healing well. No infection noted.   Neurological:      General: No focal deficit present.      Mental Status: He is alert.   Psychiatric:         Mood and Affect: Mood normal.         Behavior: Behavior normal.         Thought Content: Thought content normal.         Judgment: Judgment normal.         Assessment/Plan   Problem List Items Addressed This Visit       Closed head injury - Primary     1/11/25 while walking, felt faint.  Treated at [place] [facility], all imaging was unremarkable.  Denies nausea, vomiting, dizziness, and headaches afterward.    Recommend stopping walks and other strenuous  activity for next week.  Contact office immediately if headaches or dizziness occur.           Follow up: Scheduled 1/23/2025    Scribe Attestation  By signing my name below, I, Rafat Brower   attest that this documentation has been prepared under the direction and in the presence of John Sandoval MD.

## 2025-01-15 NOTE — ASSESSMENT & PLAN NOTE
1/11/25 while walking, felt faint.  Treated at [facility], all imaging was unremarkable.  Denies nausea, vomiting, dizziness, and headaches afterward.    Recommend stopping walks and other strenuous activity for next week.  Contact office immediately if headaches or dizziness occur.

## 2025-01-23 ENCOUNTER — APPOINTMENT (OUTPATIENT)
Dept: PRIMARY CARE | Facility: CLINIC | Age: 89
End: 2025-01-23
Payer: MEDICARE

## 2025-01-23 VITALS
DIASTOLIC BLOOD PRESSURE: 60 MMHG | HEART RATE: 71 BPM | TEMPERATURE: 97.4 F | BODY MASS INDEX: 28.11 KG/M2 | RESPIRATION RATE: 14 BRPM | OXYGEN SATURATION: 96 % | WEIGHT: 184.9 LBS | SYSTOLIC BLOOD PRESSURE: 112 MMHG

## 2025-01-23 DIAGNOSIS — D12.6 ADENOMATOUS POLYP OF COLON, UNSPECIFIED PART OF COLON: ICD-10-CM

## 2025-01-23 DIAGNOSIS — R73.03 PREDIABETES: ICD-10-CM

## 2025-01-23 DIAGNOSIS — M25.50 ARTHRALGIA OF MULTIPLE JOINTS: ICD-10-CM

## 2025-01-23 DIAGNOSIS — Z86.2 HISTORY OF SARCOIDOSIS: ICD-10-CM

## 2025-01-23 DIAGNOSIS — S09.90XD CLOSED HEAD INJURY, SUBSEQUENT ENCOUNTER: ICD-10-CM

## 2025-01-23 DIAGNOSIS — E78.2 MIXED HYPERLIPIDEMIA: Primary | ICD-10-CM

## 2025-01-23 DIAGNOSIS — E03.8 SUBCLINICAL HYPOTHYROIDISM: ICD-10-CM

## 2025-01-23 DIAGNOSIS — S01.01XD LACERATION OF SCALP, SUBSEQUENT ENCOUNTER: ICD-10-CM

## 2025-01-23 DIAGNOSIS — M62.81 MUSCLE WEAKNESS: ICD-10-CM

## 2025-01-23 DIAGNOSIS — K21.9 GASTROESOPHAGEAL REFLUX DISEASE WITHOUT ESOPHAGITIS: ICD-10-CM

## 2025-01-23 PROCEDURE — 1157F ADVNC CARE PLAN IN RCRD: CPT | Performed by: FAMILY MEDICINE

## 2025-01-23 PROCEDURE — 1159F MED LIST DOCD IN RCRD: CPT | Performed by: FAMILY MEDICINE

## 2025-01-23 PROCEDURE — 1036F TOBACCO NON-USER: CPT | Performed by: FAMILY MEDICINE

## 2025-01-23 PROCEDURE — 99214 OFFICE O/P EST MOD 30 MIN: CPT | Performed by: FAMILY MEDICINE

## 2025-01-23 PROCEDURE — 1123F ACP DISCUSS/DSCN MKR DOCD: CPT | Performed by: FAMILY MEDICINE

## 2025-01-23 PROCEDURE — 1160F RVW MEDS BY RX/DR IN RCRD: CPT | Performed by: FAMILY MEDICINE

## 2025-01-23 ASSESSMENT — ENCOUNTER SYMPTOMS
PSYCHIATRIC NEGATIVE: 1
MUSCULOSKELETAL NEGATIVE: 1
NEUROLOGICAL NEGATIVE: 1
FATIGUE: 1
CARDIOVASCULAR NEGATIVE: 1
GASTROINTESTINAL NEGATIVE: 1
RESPIRATORY NEGATIVE: 1
WOUND: 1

## 2025-01-23 NOTE — ASSESSMENT & PLAN NOTE
Lab Results   Component Value Date    HGBA1C 5.7 (H) 07/03/2024   Stable.  Repeat A1C ordered.    You have pre-diabetic level blood sugar.  This means you are at risk for developing diabetes.  ;  I recommend you avoid processed carbohydrates and sugar containing foods/beverages.  If you must have a sweetened beverage, please use Stevia and avoid artificial sweeteners such as aspartame, sucralose, sweet n low, etc.;DRINK WATER!    A good diet plan to follow is the Mediterranean diet.  Use online search to read more about this.;    Another simple rule is to shop the grocery store perimeter -thus filling your cart with fresh fruits, vegetables, lean meats (fish, chicken); dairy, cheese, and nuts.  Avoid the isles where you find bagged and boxed items that are processed. (chips, crackers, cookies, candies, snacks, etc.);    Aerobic cardiovascular exercise 150min weekly is essential for good health, BP control, sugar metabolism, and weight management.;

## 2025-01-23 NOTE — PROGRESS NOTES
Subjective   Patient ID: Juan Ashby is a 89 y.o. male who presents for Follow-up (6 mo fuv/Pt would like to discuss fatigue. PT spouse advises that the only other time he felt this way was when he was previously diagnosed with sarcoidosis ).    His fatigue has been worsening. He states that years ago he was told that he had sarcoidosis and he remembers feeling the same way at that time. His wife has also noticed that during the summer he seemed to be weaker than normal. He is unable to determine if he really has weakness in his muscles because he is not as active right now due to the weather and he is not spending much time outside. Occasionally he will go to a local building and walk about a mile indoors and feels okay.  He is doing well since his fall and the would is healing well. He continues to keep it clean and free from infection.           Review of Systems   Constitutional:  Positive for fatigue.   HENT: Negative.     Respiratory: Negative.     Cardiovascular: Negative.    Gastrointestinal: Negative.    Genitourinary: Negative.    Musculoskeletal: Negative.    Skin:  Positive for wound (head, healing).   Neurological: Negative.    Psychiatric/Behavioral: Negative.         Objective   /60 (BP Location: Left arm, Patient Position: Sitting, BP Cuff Size: Adult)   Pulse 71   Temp 36.3 °C (97.4 °F) (Temporal)   Resp 14   Wt 83.9 kg (184 lb 14.4 oz)   SpO2 96%   BMI 28.11 kg/m²     Physical Exam  Constitutional:       Appearance: Normal appearance. He is overweight.   HENT:      Head: Normocephalic.   Eyes:      Conjunctiva/sclera: Conjunctivae normal.   Cardiovascular:      Rate and Rhythm: Normal rate and regular rhythm.      Pulses: Normal pulses.      Heart sounds: Normal heart sounds.   Pulmonary:      Effort: Pulmonary effort is normal.      Breath sounds: Normal breath sounds.   Musculoskeletal:      Cervical back: Normal range of motion.   Skin:     Findings: Wound present.      Comments:  Forehead, healing well w/ no infection   Neurological:      General: No focal deficit present.      Mental Status: He is alert.   Psychiatric:         Mood and Affect: Mood normal.         Behavior: Behavior normal.         Thought Content: Thought content normal.         Judgment: Judgment normal.         Assessment/Plan   Problem List Items Addressed This Visit       Adenomatous colon polyp     Last c-scope done 4/15/2021.  Pt undecided at this time if he will continue screenings.         Arthralgia of multiple joints     Symptoms remain stable, not life altering.  Will use OTC Tylenol PRN for pain.         Gastroesophageal reflux disease without esophagitis     Stable.  May take TUMS or Rolaids as needed.  No eating within 2 hours of going to bed.  May want to elevate the head of your bed.  Avoid caffeine, chocolate, alcohol, spicy foods, acidic foods, fried foods, mint flavoring.         Relevant Orders    CBC and Auto Differential    History of sarcoidosis     By his report this was diagnosed around 1996 (seems was initially diagnosed as lymphoma as was told only had 4 weeks survival expectation)  He feels similar symptoms now - fatigue/weakness/prone to falls    Will obtain chest xray and serum ACE level         Relevant Orders    Angiotensin Converting Enzyme    XR chest 2 views    Mixed hyperlipidemia - Primary     Lab Results   Component Value Date    CHOL 237 (H) 12/14/2023    CHOL 239 (H) 06/14/2023    CHOL 234 (H) 12/07/2022     Lab Results   Component Value Date    HDL 60.1 12/14/2023    HDL 59.0 06/14/2023    HDL 66.1 12/07/2022     Lab Results   Component Value Date    LDLCALC 156 (H) 12/14/2023     Lab Results   Component Value Date    TRIG 106 12/14/2023    TRIG 95 06/14/2023    TRIG 109 12/07/2022   Stable.  Continue lifestyle measures to control cholesterol.  Repeat lipid panel ordered.    Natural efforts to lower LDL cholesterol:  1. Diet should be predominantly plant based (veggies, fruits,  "healthy fats such as olive oil, nuts, seeds, avocados, proteins from non meat such as quinoa (\"keenwha\"), chickpeas, lentil, soy, tofu - if choose animal source of protein, choose fish first, skinless poultry second, and 'red' meat as least frequent option  2. eat blueberries 1 cup daily  3. use supplement called plant stanol 950mg twice daily with meals (one brand name is \"Cholest-off\"  4. use psyllium husk fiber such as metamucil powder 1 tablespoon in 8 ounces water three times a day before meals - gradually work to this dosing over 3 weeks - starting once daily and every week add another dose          Relevant Orders    Lipid Panel    Prediabetes     Lab Results   Component Value Date    HGBA1C 5.7 (H) 07/03/2024   Stable.  Repeat A1C ordered.    You have pre-diabetic level blood sugar.  This means you are at risk for developing diabetes.  ;  I recommend you avoid processed carbohydrates and sugar containing foods/beverages.  If you must have a sweetened beverage, please use Stevia and avoid artificial sweeteners such as aspartame, sucralose, sweet n low, etc.;DRINK WATER!    A good diet plan to follow is the Mediterranean diet.  Use online search to read more about this.;    Another simple rule is to shop the grocery store perimeter -thus filling your cart with fresh fruits, vegetables, lean meats (fish, chicken); dairy, cheese, and nuts.  Avoid the isles where you find bagged and boxed items that are processed. (chips, crackers, cookies, candies, snacks, etc.);    Aerobic cardiovascular exercise 150min weekly is essential for good health, BP control, sugar metabolism, and weight management.;         Relevant Orders    CBC and Auto Differential    Comprehensive Metabolic Panel    Hemoglobin A1C    Subclinical hypothyroidism     Lab Results   Component Value Date    TSH 4.59 (H) 07/03/2024   Repeat TSH level ordered.         Relevant Orders    CBC and Auto Differential    TSH with reflex to Free T4 if abnormal "    Closed head injury    Scalp laceration     Healing well post fall, no infection.  Continue to keep area clean.          Other Visit Diagnoses       Muscle weakness        Relevant Orders    Sedimentation Rate    Creatine Kinase          Follow up: Scheduled 7/29/2025    Scribe Attestation  By signing my name below, IKeyonna Scribe   attest that this documentation has been prepared under the direction and in the presence of John Sandoval MD.

## 2025-01-23 NOTE — ASSESSMENT & PLAN NOTE
"Lab Results   Component Value Date    CHOL 237 (H) 12/14/2023    CHOL 239 (H) 06/14/2023    CHOL 234 (H) 12/07/2022     Lab Results   Component Value Date    HDL 60.1 12/14/2023    HDL 59.0 06/14/2023    HDL 66.1 12/07/2022     Lab Results   Component Value Date    LDLCALC 156 (H) 12/14/2023     Lab Results   Component Value Date    TRIG 106 12/14/2023    TRIG 95 06/14/2023    TRIG 109 12/07/2022   Stable.  Continue lifestyle measures to control cholesterol.  Repeat lipid panel ordered.    Natural efforts to lower LDL cholesterol:  1. Diet should be predominantly plant based (veggies, fruits, healthy fats such as olive oil, nuts, seeds, avocados, proteins from non meat such as quinoa (\"keenwha\"), chickpeas, lentil, soy, tofu - if choose animal source of protein, choose fish first, skinless poultry second, and 'red' meat as least frequent option  2. eat blueberries 1 cup daily  3. use supplement called plant stanol 950mg twice daily with meals (one brand name is \"Cholest-off\"  4. use psyllium husk fiber such as metamucil powder 1 tablespoon in 8 ounces water three times a day before meals - gradually work to this dosing over 3 weeks - starting once daily and every week add another dose   "

## 2025-01-23 NOTE — ASSESSMENT & PLAN NOTE
By his report this was diagnosed around 1996 (seems was initially diagnosed as lymphoma as was told only had 4 weeks survival expectation)  He feels similar symptoms now - fatigue/weakness/prone to falls    Will obtain chest xray and serum ACE level

## 2025-01-24 ENCOUNTER — LAB (OUTPATIENT)
Dept: LAB | Facility: LAB | Age: 89
End: 2025-01-24
Payer: MEDICARE

## 2025-01-24 ENCOUNTER — HOSPITAL ENCOUNTER (OUTPATIENT)
Dept: RADIOLOGY | Facility: HOSPITAL | Age: 89
Discharge: HOME | End: 2025-01-24
Payer: MEDICARE

## 2025-01-24 DIAGNOSIS — Z86.2 HISTORY OF SARCOIDOSIS: ICD-10-CM

## 2025-01-24 DIAGNOSIS — E78.2 MIXED HYPERLIPIDEMIA: ICD-10-CM

## 2025-01-24 DIAGNOSIS — K21.9 GASTROESOPHAGEAL REFLUX DISEASE WITHOUT ESOPHAGITIS: ICD-10-CM

## 2025-01-24 DIAGNOSIS — R73.03 PREDIABETES: ICD-10-CM

## 2025-01-24 DIAGNOSIS — M62.81 MUSCLE WEAKNESS: ICD-10-CM

## 2025-01-24 DIAGNOSIS — E03.8 SUBCLINICAL HYPOTHYROIDISM: ICD-10-CM

## 2025-01-24 LAB
ALBUMIN SERPL BCP-MCNC: 4.2 G/DL (ref 3.4–5)
ALP SERPL-CCNC: 74 U/L (ref 33–136)
ALT SERPL W P-5'-P-CCNC: 15 U/L (ref 10–52)
ANION GAP SERPL CALC-SCNC: 12 MMOL/L (ref 10–20)
AST SERPL W P-5'-P-CCNC: 17 U/L (ref 9–39)
BASOPHILS # BLD AUTO: 0.05 X10*3/UL (ref 0–0.1)
BASOPHILS NFR BLD AUTO: 0.7 %
BILIRUB SERPL-MCNC: 1.6 MG/DL (ref 0–1.2)
BUN SERPL-MCNC: 16 MG/DL (ref 6–23)
CALCIUM SERPL-MCNC: 9.3 MG/DL (ref 8.6–10.3)
CHLORIDE SERPL-SCNC: 105 MMOL/L (ref 98–107)
CHOLEST SERPL-MCNC: 235 MG/DL (ref 0–199)
CHOLESTEROL/HDL RATIO: 3.7
CK SERPL-CCNC: 56 U/L (ref 0–325)
CO2 SERPL-SCNC: 28 MMOL/L (ref 21–32)
CREAT SERPL-MCNC: 0.73 MG/DL (ref 0.5–1.3)
EGFRCR SERPLBLD CKD-EPI 2021: 88 ML/MIN/1.73M*2
EOSINOPHIL # BLD AUTO: 0.24 X10*3/UL (ref 0–0.4)
EOSINOPHIL NFR BLD AUTO: 3.3 %
ERYTHROCYTE [DISTWIDTH] IN BLOOD BY AUTOMATED COUNT: 12.8 % (ref 11.5–14.5)
ERYTHROCYTE [SEDIMENTATION RATE] IN BLOOD BY WESTERGREN METHOD: 7 MM/H (ref 0–20)
EST. AVERAGE GLUCOSE BLD GHB EST-MCNC: 117 MG/DL
GLUCOSE SERPL-MCNC: 112 MG/DL (ref 74–99)
HBA1C MFR BLD: 5.7 %
HCT VFR BLD AUTO: 47.7 % (ref 41–52)
HDLC SERPL-MCNC: 63 MG/DL
HGB BLD-MCNC: 15.7 G/DL (ref 13.5–17.5)
IMM GRANULOCYTES # BLD AUTO: 0.02 X10*3/UL (ref 0–0.5)
IMM GRANULOCYTES NFR BLD AUTO: 0.3 % (ref 0–0.9)
LDLC SERPL CALC-MCNC: 152 MG/DL
LYMPHOCYTES # BLD AUTO: 2.28 X10*3/UL (ref 0.8–3)
LYMPHOCYTES NFR BLD AUTO: 31.8 %
MCH RBC QN AUTO: 31.9 PG (ref 26–34)
MCHC RBC AUTO-ENTMCNC: 32.9 G/DL (ref 32–36)
MCV RBC AUTO: 97 FL (ref 80–100)
MONOCYTES # BLD AUTO: 0.55 X10*3/UL (ref 0.05–0.8)
MONOCYTES NFR BLD AUTO: 7.7 %
NEUTROPHILS # BLD AUTO: 4.04 X10*3/UL (ref 1.6–5.5)
NEUTROPHILS NFR BLD AUTO: 56.2 %
NON HDL CHOLESTEROL: 172 MG/DL (ref 0–149)
NRBC BLD-RTO: 0 /100 WBCS (ref 0–0)
PLATELET # BLD AUTO: 246 X10*3/UL (ref 150–450)
POTASSIUM SERPL-SCNC: 4 MMOL/L (ref 3.5–5.3)
PROT SERPL-MCNC: 6.2 G/DL (ref 6.4–8.2)
RBC # BLD AUTO: 4.92 X10*6/UL (ref 4.5–5.9)
SODIUM SERPL-SCNC: 141 MMOL/L (ref 136–145)
T4 FREE SERPL-MCNC: 0.86 NG/DL (ref 0.61–1.12)
TRIGL SERPL-MCNC: 102 MG/DL (ref 0–149)
TSH SERPL-ACNC: 5.38 MIU/L (ref 0.44–3.98)
VLDL: 20 MG/DL (ref 0–40)
WBC # BLD AUTO: 7.2 X10*3/UL (ref 4.4–11.3)

## 2025-01-24 PROCEDURE — 85025 COMPLETE CBC W/AUTO DIFF WBC: CPT

## 2025-01-24 PROCEDURE — 80061 LIPID PANEL: CPT

## 2025-01-24 PROCEDURE — 82164 ANGIOTENSIN I ENZYME TEST: CPT

## 2025-01-24 PROCEDURE — 80053 COMPREHEN METABOLIC PANEL: CPT

## 2025-01-24 PROCEDURE — 84443 ASSAY THYROID STIM HORMONE: CPT

## 2025-01-24 PROCEDURE — 85652 RBC SED RATE AUTOMATED: CPT

## 2025-01-24 PROCEDURE — 84439 ASSAY OF FREE THYROXINE: CPT

## 2025-01-24 PROCEDURE — 36415 COLL VENOUS BLD VENIPUNCTURE: CPT

## 2025-01-24 PROCEDURE — 83036 HEMOGLOBIN GLYCOSYLATED A1C: CPT

## 2025-01-24 PROCEDURE — 71046 X-RAY EXAM CHEST 2 VIEWS: CPT

## 2025-01-24 PROCEDURE — 82550 ASSAY OF CK (CPK): CPT

## 2025-01-26 LAB — ACE SERPL-CCNC: 50 U/L (ref 16–85)

## 2025-07-29 ENCOUNTER — APPOINTMENT (OUTPATIENT)
Dept: PRIMARY CARE | Facility: CLINIC | Age: 89
End: 2025-07-29
Payer: MEDICARE

## 2025-08-01 ENCOUNTER — APPOINTMENT (OUTPATIENT)
Dept: PRIMARY CARE | Facility: CLINIC | Age: 89
End: 2025-08-01
Payer: MEDICARE

## 2025-08-01 VITALS
SYSTOLIC BLOOD PRESSURE: 133 MMHG | HEART RATE: 67 BPM | HEIGHT: 68 IN | BODY MASS INDEX: 27.93 KG/M2 | OXYGEN SATURATION: 96 % | WEIGHT: 184.3 LBS | DIASTOLIC BLOOD PRESSURE: 66 MMHG | RESPIRATION RATE: 12 BRPM | TEMPERATURE: 97.3 F

## 2025-08-01 DIAGNOSIS — Z00.00 ROUTINE GENERAL MEDICAL EXAMINATION AT HEALTH CARE FACILITY: ICD-10-CM

## 2025-08-01 DIAGNOSIS — K21.9 GASTROESOPHAGEAL REFLUX DISEASE WITHOUT ESOPHAGITIS: ICD-10-CM

## 2025-08-01 DIAGNOSIS — R73.03 PREDIABETES: ICD-10-CM

## 2025-08-01 DIAGNOSIS — R41.3 MEMORY IMPAIRMENT: ICD-10-CM

## 2025-08-01 DIAGNOSIS — E78.2 MIXED HYPERLIPIDEMIA: Primary | ICD-10-CM

## 2025-08-01 DIAGNOSIS — E03.8 SUBCLINICAL HYPOTHYROIDISM: ICD-10-CM

## 2025-08-01 PROBLEM — S01.01XA SCALP LACERATION: Status: RESOLVED | Noted: 2025-01-15 | Resolved: 2025-08-01

## 2025-08-01 PROBLEM — S09.90XA CLOSED HEAD INJURY: Status: RESOLVED | Noted: 2025-01-15 | Resolved: 2025-08-01

## 2025-08-01 PROCEDURE — 1159F MED LIST DOCD IN RCRD: CPT | Performed by: FAMILY MEDICINE

## 2025-08-01 PROCEDURE — 1160F RVW MEDS BY RX/DR IN RCRD: CPT | Performed by: FAMILY MEDICINE

## 2025-08-01 PROCEDURE — 1170F FXNL STATUS ASSESSED: CPT | Performed by: FAMILY MEDICINE

## 2025-08-01 PROCEDURE — G0439 PPPS, SUBSEQ VISIT: HCPCS | Performed by: FAMILY MEDICINE

## 2025-08-01 ASSESSMENT — ACTIVITIES OF DAILY LIVING (ADL)
DOING_HOUSEWORK: INDEPENDENT
BATHING: INDEPENDENT
DRESSING: INDEPENDENT
MANAGING_FINANCES: INDEPENDENT
TAKING_MEDICATION: INDEPENDENT
GROCERY_SHOPPING: INDEPENDENT

## 2025-08-01 ASSESSMENT — ENCOUNTER SYMPTOMS
RESPIRATORY NEGATIVE: 1
GASTROINTESTINAL NEGATIVE: 1
CARDIOVASCULAR NEGATIVE: 1
NEUROLOGICAL NEGATIVE: 1
MUSCULOSKELETAL NEGATIVE: 1
PSYCHIATRIC NEGATIVE: 1

## 2025-08-01 ASSESSMENT — PATIENT HEALTH QUESTIONNAIRE - PHQ9
SUM OF ALL RESPONSES TO PHQ9 QUESTIONS 1 AND 2: 0
1. LITTLE INTEREST OR PLEASURE IN DOING THINGS: NOT AT ALL
2. FEELING DOWN, DEPRESSED OR HOPELESS: NOT AT ALL

## 2025-08-01 NOTE — ASSESSMENT & PLAN NOTE
Lab Results   Component Value Date    HGBA1C 5.7 (H) 01/24/2025   Stable.    You have pre-diabetic level blood sugar.  This means you are at risk for developing diabetes.  ;  I recommend you avoid processed carbohydrates and sugar containing foods/beverages.  If you must have a sweetened beverage, please use Stevia and avoid artificial sweeteners such as aspartame, sucralose, sweet n low, etc.;DRINK WATER!    A good diet plan to follow is the Mediterranean diet.  Use online search to read more about this.;    Another simple rule is to shop the grocery store perimeter -thus filling your cart with fresh fruits, vegetables, lean meats (fish, chicken); dairy, cheese, and nuts.  Avoid the isles where you find bagged and boxed items that are processed. (chips, crackers, cookies, candies, snacks, etc.);    Aerobic cardiovascular exercise 150min weekly is essential for good health, BP control, sugar metabolism, and weight management.;

## 2025-08-01 NOTE — ASSESSMENT & PLAN NOTE
"Lab Results   Component Value Date    CHOL 235 (H) 01/24/2025    CHOL 237 (H) 12/14/2023    CHOL 239 (H) 06/14/2023     Lab Results   Component Value Date    HDL 63.0 01/24/2025    HDL 60.1 12/14/2023    HDL 59.0 06/14/2023     Lab Results   Component Value Date    LDLCALC 152 (H) 01/24/2025     Lab Results   Component Value Date    TRIG 102 01/24/2025    TRIG 106 12/14/2023    TRIG 95 06/14/2023   Stable.  Continue lifestyle measures to control cholesterol.    Natural efforts to lower LDL cholesterol:  1. Diet should be predominantly plant based (veggies, fruits, healthy fats such as olive oil, nuts, seeds, avocados, proteins from non meat such as quinoa (\"keenwha\"), chickpeas, lentil, soy, tofu - if choose animal source of protein, choose fish first, skinless poultry second, and 'red' meat as least frequent option  2. eat blueberries 1 cup daily  3. use supplement called plant stanol 950mg twice daily with meals (one brand name is \"Cholest-off\"  4. use psyllium husk fiber such as metamucil powder 1 tablespoon in 8 ounces water three times a day before meals - gradually work to this dosing over 3 weeks - starting once daily and every week add another dose   "

## 2025-08-01 NOTE — PROGRESS NOTES
"Subjective   Reason for Visit: Juan Ashby is an 89 y.o. male here for a Medicare Wellness visit.     Reviewed all medications by prescribing practitioner or clinical pharmacist (such as prescriptions, OTCs, herbal therapies and supplements) and documented in the medical record.    He is becoming concerned about memory impairment. He is starting to forget things such as names and places, recently he was moving furniture from a store to his home and he was having trouble remembering which roads to take. His wife states that he has not reached a point of repeating questions of stories, but he did have a twin brother that passed away and had dementia. He does not want to establish with a specialist at this exact moment but he would like a name so that he can call when he is ready.      Patient Care Team:  John Sandoval MD as PCP - General  [facility] [place] [facility]     Review of Systems   HENT: Negative.     Respiratory: Negative.     Cardiovascular: Negative.    Gastrointestinal: Negative.    Genitourinary: Negative.    Musculoskeletal: Negative.    Neurological: Negative.    Psychiatric/Behavioral: Negative.          + worsening memory       Objective   Vitals:  /66 (BP Location: Right arm, Patient Position: Sitting, BP Cuff Size: Adult)   Pulse 67   Temp 36.3 °C (97.3 °F) (Temporal)   Resp 12   Ht 1.727 m (5' 8\")   Wt 83.6 kg (184 lb 4.8 oz)   SpO2 96%   BMI 28.02 kg/m²       Physical Exam  Constitutional:       Appearance: Normal appearance. He is overweight.   HENT:      Head: Normocephalic.      Right Ear: Tympanic membrane normal.      Left Ear: Tympanic membrane normal.      Nose: Nose normal.      Mouth/Throat:      Mouth: Mucous membranes are moist.      Pharynx: Oropharynx is clear.     Eyes:      Conjunctiva/sclera: Conjunctivae normal.       Cardiovascular:      Rate and Rhythm: Normal rate and regular rhythm.      Pulses: Normal pulses.      Heart sounds: " "Normal heart sounds.   Pulmonary:      Effort: Pulmonary effort is normal.      Breath sounds: Normal breath sounds.   Abdominal:      General: Abdomen is flat. Bowel sounds are normal.      Palpations: Abdomen is soft.   Genitourinary:     Penis: Normal.       Testes: Normal.      Prostate: Normal.      Rectum: Normal.     Musculoskeletal:         General: Normal range of motion.      Cervical back: Normal range of motion.     Skin:     General: Skin is warm and dry.     Neurological:      General: No focal deficit present.      Mental Status: He is alert and oriented to person, place, and time.     Psychiatric:         Mood and Affect: Mood normal.         Behavior: Behavior normal.         Thought Content: Thought content normal.         Judgment: Judgment normal.         Assessment/Plan   Problem List Items Addressed This Visit       Gastroesophageal reflux disease without esophagitis    Stable.  May take TUMS or Rolaids as needed.  No eating within 2 hours of going to bed.  May want to elevate the head of your bed.  Avoid caffeine, chocolate, alcohol, spicy foods, acidic foods, fried foods, mint flavoring.         Mixed hyperlipidemia - Primary    Lab Results   Component Value Date    CHOL 235 (H) 01/24/2025    CHOL 237 (H) 12/14/2023    CHOL 239 (H) 06/14/2023     Lab Results   Component Value Date    HDL 63.0 01/24/2025    HDL 60.1 12/14/2023    HDL 59.0 06/14/2023     Lab Results   Component Value Date    LDLCALC 152 (H) 01/24/2025     Lab Results   Component Value Date    TRIG 102 01/24/2025    TRIG 106 12/14/2023    TRIG 95 06/14/2023   Stable.  Continue lifestyle measures to control cholesterol.    Natural efforts to lower LDL cholesterol:  1. Diet should be predominantly plant based (veggies, fruits, healthy fats such as olive oil, nuts, seeds, avocados, proteins from non meat such as quinoa (\"keenwha\"), chickpeas, lentil, soy, tofu - if choose animal source of protein, choose fish first, skinless " "poultry second, and 'red' meat as least frequent option  2. eat blueberries 1 cup daily  3. use supplement called plant stanol 950mg twice daily with meals (one brand name is \"Cholest-off\"  4. use psyllium husk fiber such as metamucil powder 1 tablespoon in 8 ounces water three times a day before meals - gradually work to this dosing over 3 weeks - starting once daily and every week add another dose          Prediabetes    Lab Results   Component Value Date    HGBA1C 5.7 (H) 01/24/2025   Stable.    You have pre-diabetic level blood sugar.  This means you are at risk for developing diabetes.  ;  I recommend you avoid processed carbohydrates and sugar containing foods/beverages.  If you must have a sweetened beverage, please use Stevia and avoid artificial sweeteners such as aspartame, sucralose, sweet n low, etc.;DRINK WATER!    A good diet plan to follow is the Mediterranean diet.  Use online search to read more about this.;    Another simple rule is to shop the grocery store perimeter -thus filling your cart with fresh fruits, vegetables, lean meats (fish, chicken); dairy, cheese, and nuts.  Avoid the isles where you find bagged and boxed items that are processed. (chips, crackers, cookies, candies, snacks, etc.);    Aerobic cardiovascular exercise 150min weekly is essential for good health, BP control, sugar metabolism, and weight management.;         Subclinical hypothyroidism    Lab Results   Component Value Date    TSH 5.38 (H) 01/24/2025            Memory impairment    Worsening, unable to remember names and places.          Follow up: Schedule    Scribe Attestation  By signing my name below, I, Rafat Brower   attest that this documentation has been prepared under the direction and in the presence of John Sandoval MD.  "

## 2025-08-05 NOTE — PROGRESS NOTES
"Subjective   Reason for Visit: Juan Ashby is an 89 y.o. male here for a Medicare Wellness visit.               HPI    Patient Care Team:  John Sandoval MD as PCP - General  John Sandoval MD as PCP - [facility] [place] [place]     Review of Systems    Objective   Vitals:  /66 (BP Location: Right arm, Patient Position: Sitting, BP Cuff Size: Adult)   Pulse 67   Temp 36.3 °C (97.3 °F) (Temporal)   Resp 12   Ht 1.727 m (5' 8\")   Wt 83.6 kg (184 lb 4.8 oz)   SpO2 96%   BMI 28.02 kg/m²       Physical Exam    Assessment & Plan  Mixed hyperlipidemia         Subclinical hypothyroidism         Prediabetes         Gastroesophageal reflux disease without esophagitis         Memory impairment         Routine general medical examination at health care facility    Orders:  •  1 Year Follow Up In [facility] - PCP - Wellness Exam; Future              "

## 2026-02-05 ENCOUNTER — APPOINTMENT (OUTPATIENT)
Dept: PRIMARY CARE | Facility: CLINIC | Age: 89
End: 2026-02-05
Payer: MEDICARE